# Patient Record
Sex: MALE | Race: WHITE | NOT HISPANIC OR LATINO | Employment: OTHER | URBAN - METROPOLITAN AREA
[De-identification: names, ages, dates, MRNs, and addresses within clinical notes are randomized per-mention and may not be internally consistent; named-entity substitution may affect disease eponyms.]

---

## 2018-01-22 ENCOUNTER — CONVERSION ENCOUNTER (OUTPATIENT)
Dept: OTHER | Facility: HOSPITAL | Age: 68
End: 2018-01-22

## 2018-01-22 ENCOUNTER — OFFICE VISIT CONVERTED (OUTPATIENT)
Dept: CARDIOLOGY | Facility: CLINIC | Age: 68
End: 2018-01-22
Attending: SPECIALIST

## 2018-05-07 ENCOUNTER — OFFICE VISIT CONVERTED (OUTPATIENT)
Dept: CARDIOLOGY | Facility: CLINIC | Age: 68
End: 2018-05-07
Attending: SPECIALIST

## 2018-05-07 ENCOUNTER — CONVERSION ENCOUNTER (OUTPATIENT)
Dept: OTHER | Facility: HOSPITAL | Age: 68
End: 2018-05-07

## 2018-08-23 ENCOUNTER — OFFICE VISIT CONVERTED (OUTPATIENT)
Dept: CARDIOLOGY | Facility: CLINIC | Age: 68
End: 2018-08-23
Attending: SPECIALIST

## 2019-03-18 ENCOUNTER — OFFICE VISIT CONVERTED (OUTPATIENT)
Dept: CARDIOLOGY | Facility: CLINIC | Age: 69
End: 2019-03-18
Attending: SPECIALIST

## 2019-03-18 ENCOUNTER — CONVERSION ENCOUNTER (OUTPATIENT)
Dept: OTHER | Facility: HOSPITAL | Age: 69
End: 2019-03-18

## 2019-09-23 ENCOUNTER — CONVERSION ENCOUNTER (OUTPATIENT)
Dept: CARDIOLOGY | Facility: CLINIC | Age: 69
End: 2019-09-23
Attending: SPECIALIST

## 2019-09-23 ENCOUNTER — CONVERSION ENCOUNTER (OUTPATIENT)
Dept: OTHER | Facility: HOSPITAL | Age: 69
End: 2019-09-23

## 2020-07-20 ENCOUNTER — CONVERSION ENCOUNTER (OUTPATIENT)
Dept: OTHER | Facility: HOSPITAL | Age: 70
End: 2020-07-20

## 2020-07-20 ENCOUNTER — CONVERSION ENCOUNTER (OUTPATIENT)
Dept: CARDIOLOGY | Facility: CLINIC | Age: 70
End: 2020-07-20

## 2020-07-20 ENCOUNTER — OFFICE VISIT CONVERTED (OUTPATIENT)
Dept: CARDIOLOGY | Facility: CLINIC | Age: 70
End: 2020-07-20
Attending: SPECIALIST

## 2021-01-25 ENCOUNTER — OFFICE VISIT CONVERTED (OUTPATIENT)
Dept: CARDIOLOGY | Facility: CLINIC | Age: 71
End: 2021-01-25
Attending: SPECIALIST

## 2021-02-03 ENCOUNTER — HOSPITAL ENCOUNTER (OUTPATIENT)
Dept: NUCLEAR MEDICINE | Facility: HOSPITAL | Age: 71
Discharge: HOME | End: 2021-02-03
Attending: SPECIALIST

## 2021-02-22 ENCOUNTER — OFFICE VISIT CONVERTED (OUTPATIENT)
Dept: CARDIOLOGY | Facility: CLINIC | Age: 71
End: 2021-02-22
Attending: SPECIALIST

## 2021-04-13 ENCOUNTER — HOSPITAL ENCOUNTER (OUTPATIENT)
Dept: CARDIOLOGY | Facility: HOSPITAL | Age: 71
Discharge: HOME | End: 2021-04-13
Attending: SURGERY

## 2021-05-10 NOTE — PROCEDURES
Procedure Note      Patient Name: Leon Silver   Patient ID: [id number]   Sex: Male   YOB: 1950    Primary Care Provider: Shirley IYER   Referring Provider: Shirley IYER    Visit Date: January 25, 2021    Provider: Brian Hdez MD   Location: [facility]   Location Address: [address] [place] [address]  [id number]   Location Phone: [phone]          FINAL REPORT   TRANSTHORACIC ECHOCARDIOGRAM REPORT    Diagnosis: Chest pain, precordial   Height: 5'8 Weight: 226 B/P: BLOOD PRESSURE BSA: 2.16   Tech: [facility]   MEASUREMENTS:  RVID (Diastole) : RVID. (NORMAL: 0.7 to 2.4 cm max)   LVID (Systole): 3.4 cm (Diastole): 5.1 cm . (NORMAL: 3.7 - 5.4 cm)   Posterior Wall Thickness (Diastole): 1.1 cm. (NORMAL: 0.8 - 1.1 cm)   Septal Thickness (Diastole): 1 cm. (NORMAL: 0.7 - 1.2 cm)   LAID (Systole): 3.7 cm. (NORMAL: 1.9 - 3.8 cm)   Aortic Root Diameter (Diastole): 3.2 cm. (NORMAL: 2.0 - 3.7 cm)   DOPPLER:  E/A ratio 0.6 (NORMAL 0.8-2.0)   DT: 269 msec (NORMAL 140-240 msec.)   IVRT 90 m/sec (NORMAL  m/sec.)   E/E': 7 (NORMAL <8 avg.)   COMMENTS:  The patient underwent 2-D, M-Mode, and Doppler examination, including pulse-wave, continuous-wave, and color-flow analysis; the study is technically adequate.   FINDINGS:  AORTIC VALVE: fibrocalcific.   MITRAL VALVE: fibrocalcific.   TRICUSPID VALVE: Normal.   PULMONIC VALVE: Not well visualized.   LEFT ATRIUM: Normal. No intracavitary masses or clots seen. LA volume index is 35 mL/m2.   AORTIC ROOT: Normal in size with adequate motion.   LEFT VENTRICLE: Normal left ventricular systolic function. Ejection fraction 60%.   RIGHT ATRIUM: Normal.   RIGHT VENTRICLE: Normal size and function.   PERICARDIUM: Unremarkable. No evidence of effusion.   INFERIOR VENA CAVA: Diameter is 1.6 cm.   DOPPLER: Doppler examination of the aortic, mitral, tricuspid, and pulmonary valves was performed. Normal pulmonary artery systolic  pressure by Doppler. Shows mild aortic regurgitation. Grade 1 diastolic dysfunction.      CONCLUSION    1. Fibrocalcific mitral and aortic valves.  2. Normal left ventricle systolic function.  3. Mild aortic regurgitation.  4. Grade 1 diastolic dysfunction.      Brian Hdez MD  RADHA/wt                 Electronically Signed by: Madyson Cabral-, -Author on January 26, 2021 07:36:15 AM  Electronically Co-signed by: Brian Hdez MD -Reviewer on January 26, 2021 01:12:53 PM

## 2021-05-13 NOTE — PROGRESS NOTES
"   Progress Note      Patient Name: Leon Silver   Patient ID: [id number]   Sex: Male   YOB: 1950    Primary Care Provider: Shirley IYER   Referring Provider: Shirley IYER    Visit Date: July 20, 2020    Provider: Brian Hdez MD   Location: [facility]   Location Address: [address] [place] [address]  [id number]   Location Phone: [phone]          Chief Complaint  · Coronary artery disease  · Shortness of breath      History Of Present Illness  Leon Silver is a 70 year old /White male with a history of Coronary artery disease; history of coronary artery bypass graft surgery. Patient denies chest pain. Shortness of breath is stable. Blood pressure controlled at home. No PND or orthopnea.   Medications  Meds at present include: Atorvastatin 20 mg qd; Carvedilol 12.5 mg qd; Doxazosin 8 mg qd; ASA 81 mg qd; Losartan/Hydrochlorothiazide 50/12.5 mg qd; Omeprazole 20 mg qd; Fenofibrate 160 mg qd; Vitamin C 500 mg qd; Levothyroxine 50 mcg qd; Potassium 10 meq qd; Vit D3-50 mcg qd.   PAST MEDICAL HISTORY: negative for diabetes,; positive for hypertension, history of coronary artery bypass graft surgery and hyperlipidemia.   FAMILY HISTORY: positive for diabetes and heart disease. Negative for hypertension.   PSYCHO/SOCIAL HISTORY: negative for mood changes and depression; he drinks alcohol daily and does smoke.       Review of Systems  · Cardiovascular  o Admits  o : shortness of breath  o Denies  o : palpitations (fast, fluttering, or skipping beats), swelling (feet, ankles, hands), chest pain or angina pectoris  · Respiratory  o Denies  o : chronic or frequent cough, asthma or wheezing      Vitals  Date Time BP Position Site L\R Cuff Size HR RR TEMP (F) WT  HT  BMI kg/m2 BSA m2 O2 Sat HC       07/20/2020 09:10 /71 Sitting    65 - R   226lbs 0oz 5'  8\" 34.36 2.22     07/20/2020 09:10 /73 Sitting    67 - R   226lbs 0oz 5'  8\" 34.36 2.22   "         Physical Examination  · Constitutional  o Appearance  o : Alert and Oriented X3. The patient is obese.   · Respiratory  o Inspection of Chest  o : No chest wall deformities, moving equal  o Auscultation of Lungs  o : Good air entry with vesicular breath sounds  · Cardiovascular  o Heart  o :   § Auscultation of Heart  § : S1 and S2 are regular. No S3. No S4. No murmurs.  o Peripheral Vascular System  o :   § Extremities  § : Peripheral pulses were well felt. No edema. No cyanosis.  · Gastrointestinal  o Abdominal Examination  o : No masses or tenderness noted.           Assessment     IMPRESSION/PLAN    1. Coronary artery disease; history of coronary artery bypass graft surgery stable. Continue current dose of Carvedilol.    2. Morbid obesity. I asked him to be on a low fat diet and try to lose some weight.  3. Essential hypertension controlled. Continue current dose of Losartan.   4. Hyperlipidemia. Continue current dose of Lipitor.   5. Positive for nicotine use. Smoking cessation instructions were discussed with the patient again.    6. See me back in 6 months.        MD RADHA Tuttle/wt            Plan  · Instructions  o This note was transcribed by Michelle Cabral. RADHA/wt  o The above service was transcribed by Michelle Cabral on my behalf and I attest to the accuracy of the note. RADHA            Electronically Signed by: Madyson Cabral-, -Author on July 22, 2020 09:14:39 AM  Electronically Co-signed by: Brian Hdez MD -Reviewer on July 24, 2020 08:36:39 AM

## 2021-05-14 VITALS
WEIGHT: 266.25 LBS | HEIGHT: 68 IN | DIASTOLIC BLOOD PRESSURE: 64 MMHG | BODY MASS INDEX: 40.35 KG/M2 | HEART RATE: 71 BPM | SYSTOLIC BLOOD PRESSURE: 133 MMHG

## 2021-05-14 VITALS
DIASTOLIC BLOOD PRESSURE: 45 MMHG | BODY MASS INDEX: 34.25 KG/M2 | WEIGHT: 226 LBS | HEIGHT: 68 IN | SYSTOLIC BLOOD PRESSURE: 117 MMHG | HEART RATE: 76 BPM

## 2021-05-14 NOTE — PROGRESS NOTES
Progress Note      Patient Name: Leon Silver   Patient ID: [id number]   Sex: Male   YOB: 1950    Primary Care Provider: Shirley IYER   Referring Provider: Shirley IYER    Visit Date: January 25, 2021    Provider: Brian Hdez MD   Location: [facility]   Location Address: [address] [place] [address]  [id number]   Location Phone: [phone]          Chief Complaint  · Coronary artery disease  · Shortness of breath  · Back pain  · Chest pain      History Of Present Illness  Leon Silver is a 70 year old /White male with a history of Coronary artery disease; history of coronary artery bypass graft surgery. Patient denies chest pain. Recently he has been having back pain mostly on exertion relieved by rest been going on for the last few months. He has quit smoking four months ago. Shortness of breath is stable. Blood pressure controlled at home. No PND or orthopnea.   Medications  Meds at present include: Atorvastatin 20 mg qd; Carvedilol 12.5 mg qd; Doxazosin 8 mg qd; ASA 81 mg qd; Losartan/Hydrochlorothiazide 50/12.5 mg qd; Omeprazole 20 mg qd; Fenofibrate 160 mg qd; Vitamin C 500 mg qd; Levothyroxine 50 mcg qd; Potassium 10 meq qd; Vit D3-50 mcg qd; Baclofen 10 mg bid.   PAST MEDICAL HISTORY: negative for diabetes; positive for hypertension, history of single vessel coronary artery bypass graft surgery with PRETTY graft to the LAD; hyperlipidemia.   PSYCHO/SOCIAL HISTORY: he drinks alcohol moderately and recently quit smoking.       Review of Systems  · Cardiovascular  o Admits  o : shortness of breath and palpitations occasionally  o Denies  o : swelling (feet, ankles, hands), chest pain or angina pectoris  · Respiratory  o Denies  o : chronic or frequent cough, asthma or wheezing      Vitals  Date Time BP Position Site L\R Cuff Size HR RR TEMP (F) WT  HT  BMI kg/m2 BSA m2 O2 Sat FR L/min FiO2 HC       01/25/2021 12:02 /45 Sitting    " 76 - R   226lbs 0oz 5'  8\" 34.36 2.22             Physical Examination  · Constitutional  o Appearance  o : Alert and Oriented X3. The patient is obese.   · Respiratory  o Inspection of Chest  o : No chest wall deformities, moving equal  o Auscultation of Lungs  o : Good air entry with vesicular breath sounds  · Cardiovascular  o Heart  o :   § Auscultation of Heart  § : S1 and S2 are regular. No S3. No S4. No murmurs.  o Peripheral Vascular System  o :   § Extremities  § : Peripheral pulses were well felt. No edema. No cyanosis.  · Gastrointestinal  o Abdominal Examination  o : No masses or tenderness noted.   · EKG  o EKG  o : was done today  o Indications  o : Coronary artery disease   o Results  o : sinus rhythm; incomplete right bundle branch block pattern          Assessment     IMPRESSION/PLAN    1. Coronary artery disease; history of coronary artery bypass graft surgery stable. Continue current dose of Carvedilol.  Exertional angina. In view of his symptoms suggesting angina I will start him on Imdur 30 mg qd. Will do a Lexiscan stress test to rule out any significant ischemia. Will do an echocardiogram to evaluate left ventricle systolic function.   2. Morbid obesity. I asked him to be on a low fat diet and try to lose some weight.  3. Essential hypertension controlled. Continue current dose of Carvedilol and Losartan.   4. Hyperlipidemia. Continue current dose of Lipitor.      MD RADHA Tuttle/wt            Plan  · Instructions  o This note was transcribed by Michelle Cabral. RADHA/wt  o The above service was transcribed by Michelle Cabral on my behalf and I attest to the accuracy of the note. RADHA            Electronically Signed by: Madyson Cabral-, -Author on January 26, 2021 11:40:46 AM  Electronically Co-signed by: Brian Hdez MD -Reviewer on January 26, 2021 01:11:53 PM  "

## 2021-05-14 NOTE — PROGRESS NOTES
"   Progress Note      Patient Name: Leon Silver   Patient ID: [id number]   Sex: Male   YOB: 1950    Primary Care Provider: Shirley IYER   Referring Provider: Shirley IYER    Visit Date: February 22, 2021    Provider: Brian Hdez MD   Location: [facility]   Location Address: [address] [place] [address]  [id number]   Location Phone: [phone]          Chief Complaint  · Coronary artery disease  · Shortness of breath  · Back pain      History Of Present Illness  Leon Silver is a 70 year old /White male with a history of Coronary artery disease; history of coronary artery bypass graft surgery. He has some back pain mostly on exertion. Recent Lexiscan stress test was negative for ischemia. No chest pain. Shortness of breath is stable.   Medications  Meds at present include: Atorvastatin 20 mg qd; Carvedilol 12.5 mg qd; Doxazosin 8 mg qd; ASA 81 mg qd; Losartan/Hydrochlorothiazide 50/12.5 mg qd; Omeprazole 20 mg qd; Fenofibrate 160 mg qd; Vitamin C 500 mg qd; Levothyroxine 50 mcg qd; Potassium 10 meq qd; Vit D3-50 mcg qd.   PAST MEDICAL HISTORY: negative for diabetes; positive for hypertension, history of single vessel coronary artery bypass graft surgery with PRETTY graft to the LAD; hyperlipidemia.   PSYCHO/SOCIAL HISTORY: he drinks alcohol moderately and recently quit smoking.       Review of Systems  · Cardiovascular  o Admits  o : shortness of breath  o Denies  o : palpitations, swelling (feet, ankles, hands), chest pain or angina pectoris  · Respiratory  o Denies  o : chronic or frequent cough, asthma or wheezing      Vitals  Date Time BP Position Site L\R Cuff Size HR RR TEMP (F) WT  HT  BMI kg/m2 BSA m2 O2 Sat FR L/min FiO2 HC       02/22/2021 01:51 /64 Sitting    71 - R   266lbs 4oz 5'  8\" 40.48 2.41             Physical Examination  · Constitutional  o Appearance  o : Alert and Oriented X3. The patient is obese. "   · Respiratory  o Inspection of Chest  o : No chest wall deformities, moving equal  o Auscultation of Lungs  o : Good air entry with vesicular breath sounds  · Cardiovascular  o Heart  o :   § Auscultation of Heart  § : S1 and S2 are regular. No S3. No S4. No murmurs.  o Peripheral Vascular System  o :   § Extremities  § : Peripheral pulses were well felt. No edema. No cyanosis.  · Gastrointestinal  o Abdominal Examination  o : No masses or tenderness noted.           Assessment     IMPRESSION/PLAN    1. Coronary artery disease; history of coronary artery bypass graft surgery stable. Continue current dose of Carvedilol and aspirin.   I discussed with the patient the results of his stress test.   2. Back pain on exertion, probably claudication pain. I will refer him for vascular consult to evaluate for any peripheral vascular disease or aortic disease causing his back pain.   3. Hypothyroidism. Continue current dose of Levothyroxine managed by his PMD.   4. Essential hypertension controlled. Continue current dose of Carvedilol and Losartan.   5. Hyperlipidemia. Continue current dose of Lipitor.   6. See me back in six months.    MD RADHA Tuttle/wt            Plan  · Instructions  o This note was transcribed by Michelle Cabral. RADHA/wt  o The above service was transcribed by Michelle Cabral on my behalf and I attest to the accuracy of the note. RADHA            Electronically Signed by: Madyson Cabral-, -Author on February 25, 2021 07:20:37 AM  Electronically Co-signed by: Brian Hdez MD -Reviewer on March 15, 2021 12:01:13 PM

## 2021-05-15 VITALS
SYSTOLIC BLOOD PRESSURE: 158 MMHG | WEIGHT: 230 LBS | HEIGHT: 68 IN | DIASTOLIC BLOOD PRESSURE: 66 MMHG | BODY MASS INDEX: 34.86 KG/M2 | HEART RATE: 69 BPM

## 2021-05-15 VITALS
WEIGHT: 226 LBS | HEIGHT: 68 IN | SYSTOLIC BLOOD PRESSURE: 157 MMHG | DIASTOLIC BLOOD PRESSURE: 71 MMHG | BODY MASS INDEX: 34.25 KG/M2 | HEART RATE: 65 BPM

## 2021-05-15 VITALS
DIASTOLIC BLOOD PRESSURE: 68 MMHG | BODY MASS INDEX: 34.4 KG/M2 | HEART RATE: 81 BPM | HEIGHT: 68 IN | SYSTOLIC BLOOD PRESSURE: 126 MMHG | WEIGHT: 227 LBS

## 2021-05-15 VITALS
HEIGHT: 68 IN | DIASTOLIC BLOOD PRESSURE: 71 MMHG | SYSTOLIC BLOOD PRESSURE: 151 MMHG | HEART RATE: 65 BPM | BODY MASS INDEX: 34.25 KG/M2 | WEIGHT: 226 LBS

## 2021-05-16 VITALS
HEART RATE: 64 BPM | WEIGHT: 217 LBS | DIASTOLIC BLOOD PRESSURE: 70 MMHG | SYSTOLIC BLOOD PRESSURE: 169 MMHG | HEIGHT: 68 IN | BODY MASS INDEX: 32.89 KG/M2

## 2021-05-16 VITALS
HEART RATE: 69 BPM | DIASTOLIC BLOOD PRESSURE: 88 MMHG | WEIGHT: 219 LBS | SYSTOLIC BLOOD PRESSURE: 153 MMHG | BODY MASS INDEX: 33.19 KG/M2 | HEIGHT: 68 IN

## 2021-05-16 VITALS
HEIGHT: 68 IN | HEART RATE: 60 BPM | DIASTOLIC BLOOD PRESSURE: 92 MMHG | BODY MASS INDEX: 31.83 KG/M2 | WEIGHT: 210 LBS | SYSTOLIC BLOOD PRESSURE: 158 MMHG

## 2021-07-28 ENCOUNTER — OFFICE VISIT (OUTPATIENT)
Dept: ORTHOPEDIC SURGERY | Facility: CLINIC | Age: 71
End: 2021-07-28

## 2021-07-28 VITALS — WEIGHT: 266 LBS | BODY MASS INDEX: 40.32 KG/M2 | HEART RATE: 88 BPM | OXYGEN SATURATION: 99 % | HEIGHT: 68 IN

## 2021-07-28 DIAGNOSIS — M17.12 PRIMARY OSTEOARTHRITIS OF LEFT KNEE: Primary | ICD-10-CM

## 2021-07-28 DIAGNOSIS — M19.011 PRIMARY OSTEOARTHRITIS OF RIGHT SHOULDER: ICD-10-CM

## 2021-07-28 DIAGNOSIS — M25.562 LEFT KNEE PAIN, UNSPECIFIED CHRONICITY: ICD-10-CM

## 2021-07-28 DIAGNOSIS — M25.511 RIGHT SHOULDER PAIN, UNSPECIFIED CHRONICITY: ICD-10-CM

## 2021-07-28 PROCEDURE — 99203 OFFICE O/P NEW LOW 30 MIN: CPT | Performed by: ORTHOPAEDIC SURGERY

## 2021-07-28 PROCEDURE — 20610 DRAIN/INJ JOINT/BURSA W/O US: CPT | Performed by: ORTHOPAEDIC SURGERY

## 2021-07-28 RX ORDER — OMEPRAZOLE 20 MG/1
20 CAPSULE, DELAYED RELEASE ORAL DAILY
COMMUNITY
Start: 2021-07-20

## 2021-07-28 RX ORDER — DOXAZOSIN 8 MG/1
8 TABLET ORAL NIGHTLY
COMMUNITY
Start: 2021-06-21 | End: 2022-11-21 | Stop reason: ALTCHOICE

## 2021-07-28 RX ORDER — CARVEDILOL 12.5 MG/1
TABLET ORAL 2 TIMES DAILY WITH MEALS
COMMUNITY
Start: 2021-06-21 | End: 2022-05-02 | Stop reason: SDUPTHER

## 2021-07-28 RX ORDER — ATORVASTATIN CALCIUM 20 MG/1
20 TABLET, FILM COATED ORAL DAILY
COMMUNITY
Start: 2021-07-11

## 2021-07-28 RX ORDER — LEVOTHYROXINE SODIUM 0.05 MG/1
50 TABLET ORAL DAILY
COMMUNITY
Start: 2021-06-21

## 2021-07-28 RX ORDER — POTASSIUM CHLORIDE 750 MG/1
10 TABLET, EXTENDED RELEASE ORAL DAILY
COMMUNITY
Start: 2021-06-03

## 2021-07-28 RX ORDER — FENOFIBRATE 160 MG/1
160 TABLET ORAL DAILY
COMMUNITY
Start: 2021-06-03

## 2021-07-28 RX ORDER — LOSARTAN POTASSIUM AND HYDROCHLOROTHIAZIDE 12.5; 5 MG/1; MG/1
1 TABLET ORAL DAILY
COMMUNITY
Start: 2021-07-20 | End: 2022-11-21 | Stop reason: DRUGHIGH

## 2021-07-28 RX ADMIN — LIDOCAINE HYDROCHLORIDE 9 ML: 10 INJECTION, SOLUTION INFILTRATION; PERINEURAL at 11:22

## 2021-07-28 RX ADMIN — METHYLPREDNISOLONE ACETATE 80 MG: 80 INJECTION, SUSPENSION INTRA-ARTICULAR; INTRALESIONAL; INTRAMUSCULAR; SOFT TISSUE at 11:22

## 2021-07-28 NOTE — PROGRESS NOTES
"Chief Complaint  Initial Evaluation of the Left Knee and Initial Evaluation of the Right Shoulder     Subjective      Leon Silver presents to [facility] for an evaluation of left knee and right shoulder. Patient states left knee pain is localized along the medial joint line. He states knee pain has been ongoing for 6 months. He has had a gel injection in the past that has provided him with relief.     He states right shoulder pain has been ongoing for several years. He states pain is laterally and anteriorly. He denies any injury or trauma to his shoulder. He states pain with range of motion in his shoulder and has noticed a decrease in his range of motion.     Allergies   Allergen Reactions   • Morphine Rash        Social History     Socioeconomic History   • Marital status:      Spouse name: Not on file   • Number of children: Not on file   • Years of education: Not on file   • Highest education level: Not on file   Tobacco Use   • Smoking status: Never Smoker   Vaping Use   • Vaping Use: Never used        Review of Systems     Objective   Vital Signs:   Pulse 88   Ht 172.7 cm (68\")   Wt 121 kg (266 lb)   SpO2 99%   BMI 40.45 kg/m²       Physical Exam  Constitutional:       Appearance: Normal appearance. He is well-developed and normal weight.   HENT:      Head: Normocephalic.      Right Ear: Hearing and external ear normal.      Left Ear: Hearing and external ear normal.      Nose: Nose normal.   Eyes:      Conjunctiva/sclera: Conjunctivae normal.   Cardiovascular:      Rate and Rhythm: Normal rate.   Pulmonary:      Effort: Pulmonary effort is normal.      Breath sounds: No wheezing or rales.   Abdominal:      Palpations: Abdomen is soft.      Tenderness: There is no abdominal tenderness.   Musculoskeletal:      Cervical back: Normal range of motion.   Skin:     Findings: No rash.   Neurological:      Mental Status: He is alert and oriented to person, place, and time. "   Psychiatric:         Mood and Affect: Mood and affect normal.         Judgment: Judgment normal.       Ortho Exam      RIGHT SHOULDER: Positive crepitus. Forward flexion to 100 degrees. IR to Si joint. No swelling or skin discoloration. Full elbow range of motion. Decreased shoulder range of motion. Tender subacromial bursa. Tender greater tuberosity. Good tone of deltoid, triceps, wrist extensors and wrist flexors. Radial pulse 2+, ulnar pulse 2+.       LEFT KNEE: [place] legged. Good strength to hamstrings, quadriceps, dorsiflexors and plantar flexors. Sensation grossly intact. -5 degrees of extension. Flexion to 105 degrees. No swelling or skin discoloration. Tender medial and lateral joint line. Stable to varus/valgus stress. Negative Lachman. Positive crepitus. Limping gait. Full weight bearing.       Large Joint Arthrocentesis  Date/Time: 7/28/2021 11:22 AM  Consent given by: patient  Site marked: site marked  Timeout: Immediately prior to procedure a time out was called to verify the correct patient, procedure, equipment, support staff and site/side marked as required   Supporting Documentation  Indications: pain   Procedure Details  Location: LEFT KNEE, RIGHT SHOULDER.  Needle size: 22 G  Medications administered: 9 mL lidocaine 1 %; 80 mg methylPREDNISolone acetate 80 MG/ML  Patient tolerance: patient tolerated the procedure well with no immediate complications              Imaging Results (Most Recent)     Procedure Component Value Units Date/Time    XR Knee 3 View Left [[id number]] Resulted: 07/28/21 1049     Updated: 07/28/21 1053    XR Scapula Right [[id number]] Resulted: 07/28/21 1049     Updated: 07/28/21 1053           Result Review :       X-Ray Report:  Left knee(s) X-Ray  Indication: Evaluation of left knee   AP, Lateral and Standing view(s)  Findings: Demonstrates advanced bone on bone osteoarthritis. No fracture or dislocation is noted.   Prior studies available for comparison: no     X-Ray  Report:  Right shoulder(s) X-Ray  Indication: Evaluation of right shoulder   AP and Lateral view(s)  Findings: Demonstrates advanced degenerative changes of the shoulder. No fracture or dislocation is noted.   Prior studies available for comparison: no          Assessment and Plan     DX: Left knee osteoarthritis  Right shoulder osteoarthritis     Discussed treatment plans and diagnosis with the patient. Patient given a left knee injection and tolerated this procedure well. Patient given a right shoulder injection and tolerated this procedure well.     Call or return if worsening symptoms.    Follow Up     PRN.       Patient was given instructions and counseling regarding his condition or for health maintenance advice. Please see specific information pulled into the AVS if appropriate.     Scribed for Lazarus Samuel MD by Yas Mccormick.  07/28/21   11:01 EDT    I have personally performed the services described in this document as scribed by the above individual and it is both accurate and complete. Lazarus Samuel MD 07/29/21

## 2021-07-29 RX ORDER — LIDOCAINE HYDROCHLORIDE 10 MG/ML
9 INJECTION, SOLUTION INFILTRATION; PERINEURAL
Status: COMPLETED | OUTPATIENT
Start: 2021-07-28 | End: 2021-07-28

## 2021-07-29 RX ORDER — METHYLPREDNISOLONE ACETATE 80 MG/ML
80 INJECTION, SUSPENSION INTRA-ARTICULAR; INTRALESIONAL; INTRAMUSCULAR; SOFT TISSUE
Status: COMPLETED | OUTPATIENT
Start: 2021-07-28 | End: 2021-07-28

## 2021-10-21 PROBLEM — I25.10 CORONARY ARTERY DISEASE INVOLVING NATIVE CORONARY ARTERY OF NATIVE HEART WITHOUT ANGINA PECTORIS: Status: ACTIVE | Noted: 2021-10-21

## 2021-10-21 PROBLEM — E78.2 HYPERLIPEMIA, MIXED: Status: ACTIVE | Noted: 2021-10-21

## 2021-10-21 PROBLEM — Z95.5 HISTORY OF CORONARY ANGIOPLASTY WITH INSERTION OF STENT: Status: ACTIVE | Noted: 2021-10-21

## 2021-10-21 PROBLEM — I10 HYPERTENSION, ESSENTIAL: Status: ACTIVE | Noted: 2021-10-21

## 2021-10-25 ENCOUNTER — OFFICE VISIT (OUTPATIENT)
Dept: CARDIOLOGY | Facility: CLINIC | Age: 71
End: 2021-10-25

## 2021-10-25 VITALS
DIASTOLIC BLOOD PRESSURE: 88 MMHG | BODY MASS INDEX: 32.43 KG/M2 | SYSTOLIC BLOOD PRESSURE: 136 MMHG | WEIGHT: 214 LBS | HEIGHT: 68 IN | HEART RATE: 86 BPM

## 2021-10-25 DIAGNOSIS — I25.10 CORONARY ARTERY DISEASE INVOLVING NATIVE CORONARY ARTERY OF NATIVE HEART WITHOUT ANGINA PECTORIS: Primary | ICD-10-CM

## 2021-10-25 DIAGNOSIS — I10 HYPERTENSION, ESSENTIAL: ICD-10-CM

## 2021-10-25 DIAGNOSIS — Z95.5 HISTORY OF CORONARY ANGIOPLASTY WITH INSERTION OF STENT: ICD-10-CM

## 2021-10-25 DIAGNOSIS — E78.2 HYPERLIPEMIA, MIXED: ICD-10-CM

## 2021-10-25 PROCEDURE — 99214 OFFICE O/P EST MOD 30 MIN: CPT | Performed by: SPECIALIST

## 2021-10-25 RX ORDER — ASPIRIN 81 MG/1
81 TABLET ORAL DAILY
COMMUNITY

## 2022-02-16 ENCOUNTER — OFFICE VISIT (OUTPATIENT)
Dept: ORTHOPEDIC SURGERY | Facility: CLINIC | Age: 72
End: 2022-02-16

## 2022-02-16 VITALS — OXYGEN SATURATION: 98 % | BODY MASS INDEX: 32.46 KG/M2 | WEIGHT: 214.2 LBS | HEIGHT: 68 IN | HEART RATE: 88 BPM

## 2022-02-16 DIAGNOSIS — M17.12 PRIMARY OSTEOARTHRITIS OF LEFT KNEE: Primary | ICD-10-CM

## 2022-02-16 PROCEDURE — 20610 DRAIN/INJ JOINT/BURSA W/O US: CPT | Performed by: ORTHOPAEDIC SURGERY

## 2022-02-16 RX ORDER — TRAZODONE HYDROCHLORIDE 50 MG/1
TABLET ORAL
COMMUNITY
Start: 2022-01-27 | End: 2022-11-21 | Stop reason: ALTCHOICE

## 2022-02-16 NOTE — PROGRESS NOTES
"Chief Complaint  Pain of the Left Knee     Subjective      Leon Silver presents to [facility] for an evaluation of left knee. Patient has left knee osteoarthritis. Left knee pain has been ongoing for almost 1 year. Patient has had gel injections in the past that has given him relief of pain.     Allergies   Allergen Reactions   • Morphine Rash        Social History     Socioeconomic History   • Marital status:    Tobacco Use   • Smoking status: Former Smoker   • Smokeless tobacco: Current User   Vaping Use   • Vaping Use: Never used   Substance and Sexual Activity   • Alcohol use: Yes     Comment: SOME   • Drug use: Never   • Sexual activity: Defer        Review of Systems     Objective   Vital Signs:   Pulse 88   Ht 172.7 cm (68\")   Wt 97.2 kg (214 lb 3.2 oz)   SpO2 98%   BMI 32.57 kg/m²       Physical Exam  Constitutional:       Appearance: Normal appearance. Patient is well-developed and normal weight.   HENT:      Head: Normocephalic.      Right Ear: Hearing and external ear normal.      Left Ear: Hearing and external ear normal.      Nose: Nose normal.   Eyes:      Conjunctiva/sclera: Conjunctivae normal.   Cardiovascular:      Rate and Rhythm: Normal rate.   Pulmonary:      Effort: Pulmonary effort is normal.      Breath sounds: No wheezing or rales.   Abdominal:      Palpations: Abdomen is soft.      Tenderness: There is no abdominal tenderness.   Musculoskeletal:      Cervical back: Normal range of motion.   Skin:     Findings: No rash.   Neurological:      Mental Status: Patient  is alert and oriented to person, place, and time.   Psychiatric:         Mood and Affect: Mood and affect normal.         Judgment: Judgment normal.       Ortho Exam      LEFT KNEE: Flexion to 100 degrees. Full extension. Good strength to hamstrings, quadriceps, dorsiflexors and plantar flexors. Stable to varus/valgus stress. Negative lachman. Tender joint lines. No swelling, skin " discoloration or atrophy. Sensation grossly intact. Neurovascular intact.  Dorsal Pedal Pulse 2+, posterior tibialis pulse 2+.       Large Joint Arthrocentesis: L knee  Date/Time: 2/16/2022 3:31 PM  Consent given by: patient  Site marked: site marked  Timeout: Immediately prior to procedure a time out was called to verify the correct patient, procedure, equipment, support staff and site/side marked as required   Supporting Documentation  Indications: pain   Procedure Details  Location: knee - L knee  Preparation: Patient was prepped and draped in the usual sterile fashion  Needle size: 22 G  Medications administered: 48 mg hylan 48 MG/6ML  Patient tolerance: patient tolerated the procedure well with no immediate complications              Imaging Results (Most Recent)     None           Result Review :         No results found.          Assessment and Plan     DX: Left knee osteoarthritis     Discussed treatment plans and diagnosis with the patient. Patient given a left knee Synvisc one injection, he tolerated this well.     Call or return if worsening symptoms.    Follow Up     4-6 weeks.       Patient was given instructions and counseling regarding his condition or for health maintenance advice. Please see specific information pulled into the AVS if appropriate.     Scribed for Lazarus Samuel MD by Yas Mccormick.  02/16/22   15:26 EST    I have personally performed the services described in this document as scribed by the above individual and it is both accurate and complete. Lazarus Samuel MD 02/18/22

## 2022-05-01 NOTE — PROGRESS NOTES
[facility]  Cardiology progress Note    Patient Name: Leon Silver  : 1950    CHIEF COMPLAINT  Coronary artery disease s/p CABG      Subjective   Subjective     HISTORY OF PRESENT ILLNESS    Leon Silver is a 71 y.o. male with history of coronary disease s/p CABG.  No chest pain or shortness of breath.    Review of Systems:   Constitutional no fever,  no weight loss   Skin no rash   Otolaryngeal no difficulty swallowing   Cardiovascular See HPI   Pulmonary no cough, no sputum production   Gastrointestinal no constipation, no diarrhea   Genitourinary no dysuria, no hematuria   Hematologic no easy bruisability, no abnormal bleeding   Musculoskeletal no muscle pain   Neurologic no dizziness, no falls         Personal History     Social History:  reports that he has quit smoking. He uses smokeless tobacco. He reports current alcohol use. He reports that he does not use drugs.    Home Medications:  Current Outpatient Medications on File Prior to Visit   Medication Sig   • aspirin 81 MG EC tablet Take 81 mg by mouth Daily.   • atorvastatin (LIPITOR) 20 MG tablet Take 20 mg by mouth Daily.   • doxazosin (CARDURA) 8 MG tablet Take 8 mg by mouth Every Night.   • fenofibrate 160 MG tablet Take 160 mg by mouth Daily.   • fluticasone-salmeterol (ADVAIR) 100-50 MCG/DOSE DISKUS INHALE ONE PUFF TWICE DAILY RINSE MOUTH AFTER USE   • levothyroxine (SYNTHROID, LEVOTHROID) 50 MCG tablet Take 50 mcg by mouth Daily.   • losartan-hydrochlorothiazide (HYZAAR) 50-12.5 MG per tablet Take 1 tablet by mouth Daily.   • omeprazole (priLOSEC) 20 MG capsule Take 20 mg by mouth Daily.   • potassium chloride (K-DUR,KLOR-CON) 10 MEQ CR tablet Take 10 mEq by mouth Daily.   • traZODone (DESYREL) 50 MG tablet    • [DISCONTINUED] carvedilol (COREG) 12.5 MG tablet Take  by mouth 2 (Two) Times a Day With Meals.     No current facility-administered medications on file prior to visit.     Allergies:  Allergies   Allergen Reactions   •  Morphine Rash       Objective    Objective       Vitals:   Heart Rate:  [82-88] 82  BP: (144-146)/(72-76) 144/72  Body mass index is 32.84 kg/m².     Physical Exam:   Constitutional: Awake, alert, No acute distress    Eyes: PERRLA, sclerae anicteric, no conjunctival injection   HENT: NCAT, mucous membranes moist   Neck: Supple, no thyromegaly, no lymphadenopathy, trachea midline   Respiratory: Clear to auscultation bilaterally, nonlabored respirations    Cardiovascular: RRR, no murmurs or rubs. Palpable pedal pulses bilaterally   Musculoskeletal: No bilateral ankle edema, no cyanosis to extremities   Psychiatric: Appropriate affect, cooperative   Neurologic: Oriented x 3, strength symmetric in all extremities, Cranial Nerves grossly intact to confrontation, speech clear   Skin: No rashes.    Result Review    Result Review:  I have personally reviewed the available results from  [x]  Laboratory  [x]  EKG  [x]  Cardiology  [x]  Medications  [x]  Old records  []  Other:   Procedures    Impression/Plan:  1. Coronary disease s/p CABG stable: Continue aspirin 81 mg once a day.  Continue carvedilol 25 mg twice daily.  No further chest pain.  2.  Essential hypertension Uncontrolled: Continue Hyzaar 50/12.5 mg once a day.  Continue Cardura 8 mg once a day.  Increase carvedilol to 25 mg twice daily.   Blood pressure regularly. Able to tolerate this antihypertensives without any side effects.  3.  Hyperlipidemia: Continue Lipitor 20 mg once a day.  Continue fenofibrate 160 mg once a day.  Lipid and hepatic profile next visit.  Able to tolerate Lipitor without any side effects.           Brian Hdez MD   05/02/22   12:59 EDT

## 2022-05-02 ENCOUNTER — OFFICE VISIT (OUTPATIENT)
Dept: CARDIOLOGY | Facility: CLINIC | Age: 72
End: 2022-05-02

## 2022-05-02 VITALS
WEIGHT: 216 LBS | SYSTOLIC BLOOD PRESSURE: 144 MMHG | DIASTOLIC BLOOD PRESSURE: 72 MMHG | BODY MASS INDEX: 32.74 KG/M2 | HEIGHT: 68 IN | HEART RATE: 82 BPM

## 2022-05-02 DIAGNOSIS — E78.2 HYPERLIPEMIA, MIXED: ICD-10-CM

## 2022-05-02 DIAGNOSIS — Z95.1 HX OF CABG: ICD-10-CM

## 2022-05-02 DIAGNOSIS — I25.10 CORONARY ARTERY DISEASE INVOLVING NATIVE CORONARY ARTERY OF NATIVE HEART WITHOUT ANGINA PECTORIS: Primary | ICD-10-CM

## 2022-05-02 DIAGNOSIS — I10 HYPERTENSION, ESSENTIAL: ICD-10-CM

## 2022-05-02 PROCEDURE — 99214 OFFICE O/P EST MOD 30 MIN: CPT | Performed by: SPECIALIST

## 2022-05-02 RX ORDER — CARVEDILOL 25 MG/1
25 TABLET ORAL 2 TIMES DAILY WITH MEALS
Qty: 180 TABLET | Refills: 6 | Status: SHIPPED | OUTPATIENT
Start: 2022-05-02 | End: 2022-11-21 | Stop reason: SDUPTHER

## 2022-05-02 RX ORDER — CARVEDILOL 25 MG/1
25 TABLET ORAL 2 TIMES DAILY WITH MEALS
Qty: 180 TABLET | Refills: 6 | Status: SHIPPED | OUTPATIENT
Start: 2022-05-02 | End: 2022-05-02 | Stop reason: SDUPTHER

## 2022-11-19 NOTE — PROGRESS NOTES
[facility]  Cardiology progress Note    Patient Name: Leon Silver  : 1950    CHIEF COMPLAINT  CORONARY ARTERY DISEASE        Subjective   Subjective     HISTORY OF PRESENT ILLNESS    Leon Silver is a 72 y.o. male with coronary artery s/p CABG.  No chest pain or shortness of breath.    REVIEW OF SYSTEMS    Constitutional:    No fever, no weight loss  Skin:     No rash  Otolaryngeal:    No difficulty swallowing  Cardiovascular: See HPI.  Pulmonary:    No cough, no sputum production    Personal History     Social History:    reports that he has quit smoking. He uses smokeless tobacco. He reports current alcohol use. He reports that he does not use drugs.    Home Medications:  Current Outpatient Medications on File Prior to Visit   Medication Sig   • aspirin 81 MG EC tablet Take 81 mg by mouth Daily.   • atorvastatin (LIPITOR) 20 MG tablet Take 20 mg by mouth Daily.   • carvedilol (COREG) 25 MG tablet Take 1 tablet by mouth 2 (Two) Times a Day With Meals.   • fenofibrate 160 MG tablet Take 160 mg by mouth Daily.   • fluticasone-salmeterol (ADVAIR) 100-50 MCG/DOSE DISKUS INHALE ONE PUFF TWICE DAILY RINSE MOUTH AFTER USE   • levothyroxine (SYNTHROID, LEVOTHROID) 50 MCG tablet Take 50 mcg by mouth Daily.   • omeprazole (priLOSEC) 20 MG capsule Take 20 mg by mouth Daily.   • potassium chloride (K-DUR,KLOR-CON) 10 MEQ CR tablet Take 10 mEq by mouth Daily.   • losartan (COZAAR) 100 MG tablet Take 100 mg by mouth Daily.   • [DISCONTINUED] doxazosin (CARDURA) 8 MG tablet Take 8 mg by mouth Every Night.   • [DISCONTINUED] losartan-hydrochlorothiazide (HYZAAR) 50-12.5 MG per tablet Take 1 tablet by mouth Daily.   • [DISCONTINUED] traZODone (DESYREL) 50 MG tablet      No current facility-administered medications on file prior to visit.       Past Medical History:   Diagnosis Date   • Coronary artery disease    • Hyperlipidemia    • Hypertension        Allergies:  Allergies   Allergen Reactions   • Morphine  Rash       Objective    Objective       Vitals:   Heart Rate:  [54-62] 54  BP: (154-184)/(72-74) 184/74  Body mass index is 32.54 kg/m².     PHYSICAL EXAM:    General Appearance:   · well developed  · well nourished  HENT:   · oropharynx moist  · lips not cyanotic  Neck:  · thyroid not enlarged  · supple  Respiratory:  · no respiratory distress  · normal breath sounds  · no rales  Cardiovascular:  · no jugular venous distention  · regular rhythm  · apical impulse normal  · S1 normal, S2 normal  · no S3, no S4   · no murmur  · no rub, no thrill  · carotid pulses normal; no bruit  · pedal pulses normal  · lower extremity edema: none    Skin:   · warm, dry  Psychiatric:  · judgement and insight appropriate  · normal mood and affect        Result Review:  I have personally reviewed the available results from  [x]  Laboratory  [x]  EKG  [x]  Cardiology  [x]  Medications  [x]  Old records  []  Other:     Procedures    Impression/Plan:  1. Coronary s/p CABG stable: Continue aspirin 81 mg a day.  Continue carvedilol 25 mg twice a day.  No chest pain.  2.  Hyperlipidemia: Continue Lipitor 20 mg a day.  Continue fenofibrate 160 mg a day.  3.  Essential hypertension Uncontrolled: Continue Hyzaar 50/12.5 mg a day.  Continue Cardura 8 mg a day.  Continue carvedilol 25 mg twice a day.  Add clonidine 0.2 mg twice a day.  Monitor blood pressure regularly.           Brian Hdez MD   11/21/22   13:08 EST

## 2022-11-21 ENCOUNTER — OFFICE VISIT (OUTPATIENT)
Dept: CARDIOLOGY | Facility: CLINIC | Age: 72
End: 2022-11-21

## 2022-11-21 VITALS
HEIGHT: 68 IN | SYSTOLIC BLOOD PRESSURE: 184 MMHG | HEART RATE: 54 BPM | WEIGHT: 214 LBS | BODY MASS INDEX: 32.43 KG/M2 | DIASTOLIC BLOOD PRESSURE: 74 MMHG

## 2022-11-21 DIAGNOSIS — Z95.1 HX OF CABG: ICD-10-CM

## 2022-11-21 DIAGNOSIS — I10 HYPERTENSION, ESSENTIAL: ICD-10-CM

## 2022-11-21 DIAGNOSIS — E78.2 HYPERLIPEMIA, MIXED: ICD-10-CM

## 2022-11-21 DIAGNOSIS — I25.10 CORONARY ARTERY DISEASE INVOLVING NATIVE CORONARY ARTERY OF NATIVE HEART WITHOUT ANGINA PECTORIS: Primary | ICD-10-CM

## 2022-11-21 PROCEDURE — 99214 OFFICE O/P EST MOD 30 MIN: CPT | Performed by: SPECIALIST

## 2022-11-21 RX ORDER — LOSARTAN POTASSIUM 100 MG/1
100 TABLET ORAL DAILY
Qty: 180 TABLET | Refills: 6 | Status: SHIPPED | OUTPATIENT
Start: 2022-11-21 | End: 2023-02-27 | Stop reason: ALTCHOICE

## 2022-11-21 RX ORDER — CARVEDILOL 25 MG/1
25 TABLET ORAL 2 TIMES DAILY WITH MEALS
Qty: 180 TABLET | Refills: 6 | Status: SHIPPED | OUTPATIENT
Start: 2022-11-21

## 2022-11-21 RX ORDER — CLONIDINE HYDROCHLORIDE 0.1 MG/1
0.2 TABLET ORAL EVERY 12 HOURS SCHEDULED
Status: SHIPPED | OUTPATIENT
Start: 2022-11-21

## 2022-11-21 RX ORDER — LOSARTAN POTASSIUM 100 MG/1
100 TABLET ORAL DAILY
COMMUNITY
Start: 2022-11-17 | End: 2022-11-21 | Stop reason: SDUPTHER

## 2023-02-24 NOTE — PROGRESS NOTES
[facility]  Cardiology progress Note    Patient Name: Leon Silver  : 1950    CHIEF COMPLAINT  Coronary artery disease        Subjective   Subjective     HISTORY OF PRESENT ILLNESS    Leon Silver is a 72 y.o. male with coronary artery s/p CABG.  No chest pain or shortness of breath.  Recently noted to have paroxysmal atrial fibrillation.  No palpitations.    REVIEW OF SYSTEMS    Constitutional:    No fever, no weight loss  Skin:     No rash  Otolaryngeal:    No difficulty swallowing  Cardiovascular: See HPI.  Pulmonary:    No cough, no sputum production    Personal History     Social History:    reports that he has quit smoking. He uses smokeless tobacco. He reports current alcohol use. He reports that he does not use drugs.    Home Medications:  Current Outpatient Medications on File Prior to Visit   Medication Sig   • ascorbic acid (VITAMIN C) 250 MG tablet Take 250 mg by mouth Daily.   • aspirin 81 MG EC tablet Take 81 mg by mouth Daily.   • atorvastatin (LIPITOR) 20 MG tablet Take 20 mg by mouth Daily.   • carvedilol (COREG) 25 MG tablet Take 1 tablet by mouth 2 (Two) Times a Day With Meals.   • cholecalciferol (VITAMIN D3) 25 MCG (1000 UT) tablet Take 500 Units by mouth Daily.   • cyanocobalamin (VITAMIN B-12) 1000 MCG tablet Take 1,000 mcg by mouth Daily.   • Eliquis 5 MG tablet tablet Take 1 tablet by mouth Every 12 (Twelve) Hours.   • fenofibrate 160 MG tablet Take 160 mg by mouth Daily.   • finasteride (PROSCAR) 5 MG tablet    • fluticasone-salmeterol (ADVAIR) 100-50 MCG/DOSE DISKUS INHALE ONE PUFF TWICE DAILY RINSE MOUTH AFTER USE   • levothyroxine (SYNTHROID, LEVOTHROID) 50 MCG tablet Take 50 mcg by mouth Daily.   • omeprazole (priLOSEC) 20 MG capsule Take 20 mg by mouth Daily.   • potassium chloride (K-DUR,KLOR-CON) 10 MEQ CR tablet Take 10 mEq by mouth Daily.   • tamsulosin (FLOMAX) 0.4 MG capsule 24 hr capsule TAKE ONE CAPSULE BY MOUTH EACH NIGHT AT BEDTIME   • [DISCONTINUED]  losartan (COZAAR) 100 MG tablet Take 1 tablet by mouth Daily.     Current Facility-Administered Medications on File Prior to Visit   Medication   • cloNIDine (CATAPRES) tablet 0.2 mg       Past Medical History:   Diagnosis Date   • Coronary artery disease    • Hyperlipidemia    • Hypertension        Allergies:  Allergies   Allergen Reactions   • Morphine Rash       Objective    Objective       Vitals:   Heart Rate:  [82] 82  BP: (113)/(58) 113/58  Body mass index is 28.13 kg/m².     PHYSICAL EXAM:    General Appearance:   · well developed  · well nourished  HENT:   · oropharynx moist  · lips not cyanotic  Neck:  · thyroid not enlarged  · supple  Respiratory:  · no respiratory distress  · normal breath sounds  · no rales  Cardiovascular:  · no jugular venous distention  · regular rhythm  · apical impulse normal  · S1 normal, S2 normal  · no S3, no S4   · no murmur  · no rub, no thrill  · carotid pulses normal; no bruit  · pedal pulses normal  · lower extremity edema: none    Skin:   · warm, dry  Psychiatric:  · judgement and insight appropriate  · normal mood and affect        Result Review:  I have personally reviewed the available results from  [x]  Laboratory  [x]  EKG  [x]  Cardiology  [x]  Medications  [x]  Old records  []  Other:     Procedures    Impression/Plan:  1.  Coronary disease s/p CABG stable: Continue aspirin 81 mg once a day.  Continue carvedilol 25 mg twice a day.  No chest pain.  Sestamibi stress test in 2021 negative for any ischemia.  Moderate risk for any surgical procedure.  2.  Essential hypertension controlled:  Continue carvedilol 25 mg twice a day.  Continue clonidine 0.2 mg twice daily.  Monitor blood pressure regularly.  3.  Hyperlipidemia: Continue Lipitor 20 mg once a day.  Continue fenofibrate 160 mg once a day.  4.  Paroxysmal atrial fibrillation: Continue Eliquis 5 mg twice a day for stroke prevention.  Risk benefits discussed with the patient.  Continue carvedilol 25 mg twice a day  for rate control.  Advised to stop Eliquis 2-3 days prior to any surgical procedure.  Repeat echocardiogram.          Brian Hdez MD   02/27/23   10:38 EST

## 2023-02-27 ENCOUNTER — OFFICE VISIT (OUTPATIENT)
Dept: CARDIOLOGY | Facility: CLINIC | Age: 73
End: 2023-02-27
Payer: MEDICARE

## 2023-02-27 VITALS
HEIGHT: 68 IN | WEIGHT: 185 LBS | BODY MASS INDEX: 28.04 KG/M2 | DIASTOLIC BLOOD PRESSURE: 58 MMHG | SYSTOLIC BLOOD PRESSURE: 113 MMHG | HEART RATE: 82 BPM

## 2023-02-27 DIAGNOSIS — E78.2 HYPERLIPEMIA, MIXED: ICD-10-CM

## 2023-02-27 DIAGNOSIS — I10 HYPERTENSION, ESSENTIAL: Primary | ICD-10-CM

## 2023-02-27 DIAGNOSIS — I48.0 PAROXYSMAL ATRIAL FIBRILLATION: ICD-10-CM

## 2023-02-27 DIAGNOSIS — I25.10 CORONARY ARTERY DISEASE INVOLVING NATIVE CORONARY ARTERY OF NATIVE HEART WITHOUT ANGINA PECTORIS: ICD-10-CM

## 2023-02-27 DIAGNOSIS — Z95.1 HX OF CABG: ICD-10-CM

## 2023-02-27 PROCEDURE — 99214 OFFICE O/P EST MOD 30 MIN: CPT | Performed by: SPECIALIST

## 2023-02-27 RX ORDER — APIXABAN 5 MG/1
1 TABLET, FILM COATED ORAL EVERY 12 HOURS SCHEDULED
COMMUNITY
Start: 2023-02-17

## 2023-02-27 RX ORDER — TAMSULOSIN HYDROCHLORIDE 0.4 MG/1
CAPSULE ORAL
COMMUNITY
Start: 2023-02-07

## 2023-02-27 RX ORDER — ASCORBIC ACID 250 MG
250 TABLET ORAL DAILY
COMMUNITY

## 2023-02-27 RX ORDER — MELATONIN
500 DAILY
COMMUNITY

## 2023-02-27 RX ORDER — FINASTERIDE 5 MG/1
TABLET, FILM COATED ORAL
COMMUNITY
Start: 2023-02-24

## 2023-05-16 ENCOUNTER — TELEPHONE (OUTPATIENT)
Dept: CARDIOLOGY | Facility: CLINIC | Age: 73
End: 2023-05-16
Payer: MEDICARE

## 2023-05-16 NOTE — TELEPHONE ENCOUNTER
----- Message from Alyse Bruce sent at 5/15/2023  1:45 PM EDT -----    ----- Message -----  From: Brian Hdez MD  Sent: 5/15/2023   1:27 PM EDT  To: Alyse Bruce    Notify pt echocardiogram shows normal heart function and no significant valve abnormality. Keep follow up as scheduled. Notify office if symptoms persist/worsen

## 2023-05-16 NOTE — TELEPHONE ENCOUNTER
Gave Patients daugther message that echo shows normal heart function and no significant valve abnormality.  keep f/u as scheduled .  call office if symptoms worsen

## 2023-08-25 NOTE — PROGRESS NOTES
[facility]  Cardiology progress Note    Patient Name: Leon Silver  : 1950    CHIEF COMPLAINT  Coronary artery disease        Subjective   Subjective     HISTORY OF PRESENT ILLNESS    Leon Silver is a 73 y.o. male with coronary disease s/p CABG.  No chest pain or shortness of breath.    REVIEW OF SYSTEMS    Constitutional:    No fever, no weight loss  Skin:     No rash  Otolaryngeal:    No difficulty swallowing  Cardiovascular: See HPI.  Pulmonary:    No cough, no sputum production    Personal History     Social History:    reports that he has quit smoking. He uses smokeless tobacco. He reports current alcohol use. He reports that he does not use drugs.    Home Medications:  Current Outpatient Medications on File Prior to Visit   Medication Sig    ascorbic acid (VITAMIN C) 250 MG tablet Take 1 tablet by mouth Daily.    aspirin 81 MG EC tablet Take 1 tablet by mouth Daily.    atorvastatin (LIPITOR) 20 MG tablet Take 1 tablet by mouth Daily.    carvedilol (COREG) 25 MG tablet Take 1 tablet by mouth 2 (Two) Times a Day With Meals.    cholecalciferol (VITAMIN D3) 25 MCG (1000 UT) tablet Take 0.5 tablets by mouth Daily.    cyanocobalamin (VITAMIN B-12) 1000 MCG tablet Take 1 tablet by mouth Daily.    Eliquis 5 MG tablet tablet Take 1 tablet by mouth Every 12 (Twelve) Hours.    fenofibrate 160 MG tablet Take 1 tablet by mouth Daily.    finasteride (PROSCAR) 5 MG tablet     fluticasone-salmeterol (ADVAIR) 100-50 MCG/DOSE DISKUS INHALE ONE PUFF TWICE DAILY RINSE MOUTH AFTER USE    levothyroxine (SYNTHROID, LEVOTHROID) 50 MCG tablet Take 1 tablet by mouth Daily.    omeprazole (priLOSEC) 20 MG capsule Take 1 capsule by mouth Daily.    potassium chloride (K-DUR,KLOR-CON) 10 MEQ CR tablet Take 1 tablet by mouth Daily.    tamsulosin (FLOMAX) 0.4 MG capsule 24 hr capsule TAKE ONE CAPSULE BY MOUTH EACH NIGHT AT BEDTIME    traMADol (ULTRAM) 50 MG tablet Take 1 tablet by mouth Every 8 (Eight) Hours As Needed for  Moderate Pain.     Current Facility-Administered Medications on File Prior to Visit   Medication    cloNIDine (CATAPRES) tablet 0.2 mg       Past Medical History:   Diagnosis Date    Coronary artery disease     Hyperlipidemia     Hypertension        Allergies:  Allergies   Allergen Reactions    Morphine Rash       Objective    Objective       Vitals:   Heart Rate:  [74] 74  BP: (148)/(96) 148/96  Body mass index is 30.81 kg/mý.     PHYSICAL EXAM:    General Appearance:   well developed  well nourished  HENT:   oropharynx moist  lips not cyanotic  Neck:  thyroid not enlarged  supple  Respiratory:  no respiratory distress  normal breath sounds  no rales  Cardiovascular:  no jugular venous distention  regular rhythm  apical impulse normal  S1 normal, S2 normal  no S3, no S4   no murmur  no rub, no thrill  carotid pulses normal; no bruit  pedal pulses normal  lower extremity edema: none    Skin:   warm, dry  Psychiatric:  judgement and insight appropriate  normal mood and affect        Result Review:  I have personally reviewed the available results from  [x]  Laboratory  [x]  EKG  [x]  Cardiology  [x]  Medications  [x]  Old records  []  Other:     Procedures    Results for orders placed in visit on 05/15/23    Adult Transthoracic Echo Complete W/ Cont if Necessary Per Protocol    Interpretation Summary  Fibrocalcific mitral and aortic valves.  Normal left-ventricular systolic function.  Mild AI.  Mild MR.  Trace TR.     Impression/Plan:   1.  Coronary disease s/p CABG stable: Continue aspirin 81 mg once a day.  Continue carvedilol 25 mg twice a day.  No chest pain.  Sestamibi stress test in 2021 negative for any ischemia.  2.  Essential hypertension controlled:  Continue carvedilol 25 mg twice a day.  Continue clonidine 0.2 mg twice daily.  Monitor blood pressure regularly.  3.  Hyperlipidemia: Continue Lipitor 20 mg once a day.  Continue fenofibrate 160 mg once a day.  4.  Paroxysmal atrial fibrillation: Continue  Eliquis 5 mg twice a day for stroke prevention.  Risk benefits discussed with the patient.  Continue carvedilol 25 mg twice a day.              Brian Hdez MD   08/28/23   13:05 EDT

## 2023-08-28 ENCOUNTER — OFFICE VISIT (OUTPATIENT)
Dept: CARDIOLOGY | Facility: CLINIC | Age: 73
End: 2023-08-28
Payer: MEDICARE

## 2023-08-28 VITALS
BODY MASS INDEX: 30.71 KG/M2 | SYSTOLIC BLOOD PRESSURE: 148 MMHG | WEIGHT: 202.6 LBS | DIASTOLIC BLOOD PRESSURE: 96 MMHG | HEIGHT: 68 IN | HEART RATE: 74 BPM

## 2023-08-28 DIAGNOSIS — I10 HYPERTENSION, ESSENTIAL: ICD-10-CM

## 2023-08-28 DIAGNOSIS — I48.0 PAROXYSMAL ATRIAL FIBRILLATION: ICD-10-CM

## 2023-08-28 DIAGNOSIS — I25.10 CORONARY ARTERY DISEASE INVOLVING NATIVE CORONARY ARTERY OF NATIVE HEART WITHOUT ANGINA PECTORIS: Primary | ICD-10-CM

## 2023-08-28 DIAGNOSIS — E78.2 HYPERLIPEMIA, MIXED: ICD-10-CM

## 2023-08-28 DIAGNOSIS — Z95.1 HX OF CABG: ICD-10-CM

## 2023-08-28 PROCEDURE — 1159F MED LIST DOCD IN RCRD: CPT | Performed by: SPECIALIST

## 2023-08-28 PROCEDURE — 3077F SYST BP >= 140 MM HG: CPT | Performed by: SPECIALIST

## 2023-08-28 PROCEDURE — 1160F RVW MEDS BY RX/DR IN RCRD: CPT | Performed by: SPECIALIST

## 2023-08-28 PROCEDURE — 3080F DIAST BP >= 90 MM HG: CPT | Performed by: SPECIALIST

## 2023-08-28 PROCEDURE — 99214 OFFICE O/P EST MOD 30 MIN: CPT | Performed by: SPECIALIST

## 2023-08-28 RX ORDER — TRAMADOL HYDROCHLORIDE 50 MG/1
50 TABLET ORAL EVERY 8 HOURS PRN
COMMUNITY

## 2024-03-07 ENCOUNTER — TELEPHONE (OUTPATIENT)
Dept: CARDIOLOGY | Facility: CLINIC | Age: 74
End: 2024-03-07
Payer: MEDICARE

## 2024-03-07 NOTE — TELEPHONE ENCOUNTER
Caller: Leon Silver    Relationship: Self    Best call back number: [phone] (home) [phone] (work)         Who are you requesting to speak with (clinical staff, provider,  specific staff member):         What was the call regarding: PATIENT'S FAMILY MEMBER WAS CALLING TO CONFIRM APPOINTMENT FOR PATIENT BUT THEY WEREN'T LISTED ON A VERBAL FORM. PLEASE CALL PATIENT TO CONFIRM APPOINTMENT.

## 2024-03-11 NOTE — PROGRESS NOTES
[facility]  Cardiology progress Note    Patient Name: Leon Silver  : 1950    CHIEF COMPLAINT  CAD        Subjective   Subjective     HISTORY OF PRESENT ILLNESS    Leon Silver is a 73 y.o. male with CAD s/p CABG.  No chest pain or shortness of breath.    REVIEW OF SYSTEMS    Constitutional:    No fever, no weight loss  Skin:     No rash  Otolaryngeal:    No difficulty swallowing  Cardiovascular: See HPI.  Pulmonary:    No cough, no sputum production    Personal History     Social History:    reports that he has quit smoking. He uses smokeless tobacco. He reports current alcohol use. He reports that he does not use drugs.    Home Medications:  Current Outpatient Medications on File Prior to Visit   Medication Sig    ascorbic acid (VITAMIN C) 250 MG tablet Take 1 tablet by mouth Daily.    aspirin 81 MG EC tablet Take 1 tablet by mouth Daily.    atorvastatin (LIPITOR) 20 MG tablet Take 1 tablet by mouth Daily.    carvedilol (COREG) 25 MG tablet Take 1 tablet by mouth 2 (Two) Times a Day With Meals.    cholecalciferol (VITAMIN D3) 25 MCG (1000 UT) tablet Take 0.5 tablets by mouth Daily.    cyanocobalamin (VITAMIN B-12) 1000 MCG tablet Take 1 tablet by mouth Daily.    Eliquis 5 MG tablet tablet Take 1 tablet by mouth Every 12 (Twelve) Hours.    fenofibrate 160 MG tablet Take 1 tablet by mouth Daily.    finasteride (PROSCAR) 5 MG tablet     fluticasone-salmeterol (ADVAIR) 100-50 MCG/DOSE DISKUS INHALE ONE PUFF TWICE DAILY RINSE MOUTH AFTER USE    furosemide (LASIX) 20 MG tablet Take 1 tablet by mouth 2 (Two) Times a Day.    levothyroxine (SYNTHROID, LEVOTHROID) 50 MCG tablet Take 1 tablet by mouth Daily.    losartan (COZAAR) 100 MG tablet Take 1 tablet by mouth Daily.    omeprazole (priLOSEC) 20 MG capsule Take 1 capsule by mouth Daily.    potassium chloride (K-DUR,KLOR-CON) 10 MEQ CR tablet Take 1 tablet by mouth 2 (Two) Times a Day.    rOPINIRole (REQUIP) 0.5 MG tablet     tamsulosin (FLOMAX) 0.4 MG  capsule 24 hr capsule TAKE ONE CAPSULE BY MOUTH EACH NIGHT AT BEDTIME    [DISCONTINUED] traMADol (ULTRAM) 50 MG tablet Take 1 tablet by mouth Every 8 (Eight) Hours As Needed for Moderate Pain.     Current Facility-Administered Medications on File Prior to Visit   Medication    cloNIDine (CATAPRES) tablet 0.2 mg       Past Medical History:   Diagnosis Date    Coronary artery disease     Hyperlipidemia     Hypertension        Allergies:  Allergies   Allergen Reactions    Morphine Rash       Objective    Objective       Vitals:   Heart Rate:  [71-73] 71  BP: (125-142)/(61-65) 125/61  Body mass index is 33.61 kg/m².     PHYSICAL EXAM:    General Appearance:   well developed  well nourished  HENT:   oropharynx moist  lips not cyanotic  Neck:  thyroid not enlarged  supple  Respiratory:  no respiratory distress  normal breath sounds  no rales  Cardiovascular:  no jugular venous distention  regular rhythm  apical impulse normal  S1 normal, S2 normal  no S3, no S4   no murmur  no rub, no thrill  carotid pulses normal; no bruit  pedal pulses normal  lower extremity edema: none    Skin:   warm, dry  Psychiatric:  judgement and insight appropriate  normal mood and affect        Result Review:  I have personally reviewed the available results from  [x]  Laboratory  [x]  EKG  [x]  Cardiology  [x]  Medications  [x]  Old records  []  Other:     Procedures    Results for orders placed in visit on 05/15/23    Adult Transthoracic Echo Complete W/ Cont if Necessary Per Protocol    Interpretation Summary  Fibrocalcific mitral and aortic valves.  Normal left-ventricular systolic function.  Mild AI.  Mild MR.  Trace TR.     Impression/Plan:  1.  Coronary disease s/p CABG stable: Continue aspirin 81 mg once a day.  Continue carvedilol 25 mg twice a day.  No chest pain.  Sestamibi stress test in 2021 negative for any ischemia.  2.  Essential hypertension controlled:  Continue carvedilol 25 mg twice a day.  Continue losartan 100 mg once a  day.  Continue clonidine 0.2 mg twice a day.  Monitor blood pressure regularly.  3.  Mixed hyperlipidemia: Continue Lipitor 20 mg once a day.  Continue fenofibrate 160 mg once a day.  Monitor lipid and hepatic profile.  4.  Paroxysmal atrial fibrillation: Continue Eliquis 5 mg twice a day for stroke prevention.  Continue carvedilol 25 mg twice a day for rate control.        Brian Hdez MD   03/14/24   13:27 EDT

## 2024-03-14 ENCOUNTER — OFFICE VISIT (OUTPATIENT)
Dept: CARDIOLOGY | Facility: CLINIC | Age: 74
End: 2024-03-14
Payer: MEDICARE

## 2024-03-14 VITALS
SYSTOLIC BLOOD PRESSURE: 125 MMHG | HEART RATE: 71 BPM | HEIGHT: 65 IN | DIASTOLIC BLOOD PRESSURE: 61 MMHG | WEIGHT: 202 LBS | BODY MASS INDEX: 33.66 KG/M2

## 2024-03-14 DIAGNOSIS — Z95.1 HX OF CABG: ICD-10-CM

## 2024-03-14 DIAGNOSIS — I25.10 CORONARY ARTERY DISEASE INVOLVING NATIVE CORONARY ARTERY OF NATIVE HEART WITHOUT ANGINA PECTORIS: Primary | ICD-10-CM

## 2024-03-14 DIAGNOSIS — I10 HYPERTENSION, ESSENTIAL: ICD-10-CM

## 2024-03-14 DIAGNOSIS — E78.2 HYPERLIPEMIA, MIXED: ICD-10-CM

## 2024-03-14 DIAGNOSIS — I48.0 PAROXYSMAL ATRIAL FIBRILLATION: ICD-10-CM

## 2024-03-14 PROCEDURE — 3074F SYST BP LT 130 MM HG: CPT | Performed by: SPECIALIST

## 2024-03-14 PROCEDURE — 1160F RVW MEDS BY RX/DR IN RCRD: CPT | Performed by: SPECIALIST

## 2024-03-14 PROCEDURE — 3078F DIAST BP <80 MM HG: CPT | Performed by: SPECIALIST

## 2024-03-14 PROCEDURE — 1159F MED LIST DOCD IN RCRD: CPT | Performed by: SPECIALIST

## 2024-03-14 PROCEDURE — 99214 OFFICE O/P EST MOD 30 MIN: CPT | Performed by: SPECIALIST

## 2024-03-14 RX ORDER — ROPINIROLE 0.5 MG/1
TABLET, FILM COATED ORAL
COMMUNITY
Start: 2024-01-30

## 2024-03-14 RX ORDER — LOSARTAN POTASSIUM 100 MG/1
100 TABLET ORAL DAILY
COMMUNITY

## 2024-03-14 RX ORDER — FUROSEMIDE 20 MG/1
20 TABLET ORAL 2 TIMES DAILY
COMMUNITY

## 2024-09-09 ENCOUNTER — OFFICE VISIT (OUTPATIENT)
Dept: CARDIOLOGY | Facility: CLINIC | Age: 74
End: 2024-09-09
Payer: MEDICARE

## 2024-09-09 VITALS
SYSTOLIC BLOOD PRESSURE: 148 MMHG | HEART RATE: 73 BPM | HEIGHT: 65 IN | WEIGHT: 182 LBS | DIASTOLIC BLOOD PRESSURE: 75 MMHG | BODY MASS INDEX: 30.32 KG/M2

## 2024-09-09 DIAGNOSIS — I25.10 CORONARY ARTERY DISEASE INVOLVING NATIVE CORONARY ARTERY OF NATIVE HEART WITHOUT ANGINA PECTORIS: Primary | ICD-10-CM

## 2024-09-09 DIAGNOSIS — E78.2 HYPERLIPEMIA, MIXED: ICD-10-CM

## 2024-09-09 DIAGNOSIS — I48.0 PAROXYSMAL ATRIAL FIBRILLATION: ICD-10-CM

## 2024-09-09 DIAGNOSIS — I10 HYPERTENSION, ESSENTIAL: ICD-10-CM

## 2024-09-09 DIAGNOSIS — Z95.1 HX OF CABG: ICD-10-CM

## 2024-09-09 PROCEDURE — 3078F DIAST BP <80 MM HG: CPT | Performed by: SPECIALIST

## 2024-09-09 PROCEDURE — 99214 OFFICE O/P EST MOD 30 MIN: CPT | Performed by: SPECIALIST

## 2024-09-09 PROCEDURE — 1159F MED LIST DOCD IN RCRD: CPT | Performed by: SPECIALIST

## 2024-09-09 PROCEDURE — 3077F SYST BP >= 140 MM HG: CPT | Performed by: SPECIALIST

## 2024-09-09 PROCEDURE — 1160F RVW MEDS BY RX/DR IN RCRD: CPT | Performed by: SPECIALIST

## 2024-09-09 RX ORDER — TRAMADOL HYDROCHLORIDE 50 MG/1
TABLET ORAL
COMMUNITY
Start: 2024-06-11

## 2024-09-09 RX ORDER — TRAZODONE HYDROCHLORIDE 50 MG/1
TABLET, FILM COATED ORAL
COMMUNITY
Start: 2024-07-23

## 2025-03-06 NOTE — PROGRESS NOTES
[facility]  Cardiology progress Note    Patient Name: Leon Silver  : 1950    CHIEF COMPLAINT  CAD        Subjective   Subjective     HISTORY OF PRESENT ILLNESS    Leon Silver is a 74 y.o. male with CAD status post CABG.  No chest pain.    REVIEW OF SYSTEMS    Constitutional:    No fever, no weight loss  Skin:     No rash  Otolaryngeal:    No difficulty swallowing  Cardiovascular: See HPI.  Pulmonary:    No cough, no sputum production    Personal History     Social History:    reports that he has quit smoking. He uses smokeless tobacco. He reports current alcohol use. He reports that he does not use drugs.    Home Medications:  Current Outpatient Medications on File Prior to Visit   Medication Sig    amlodipine-olmesartan (JACOB) 5-40 MG per tablet     ascorbic acid (VITAMIN C) 250 MG tablet Take 1 tablet by mouth Daily.    aspirin 81 MG EC tablet Take 1 tablet by mouth Daily.    atorvastatin (LIPITOR) 20 MG tablet Take 1 tablet by mouth Daily.    carvedilol (COREG) 25 MG tablet Take 1 tablet by mouth 2 (Two) Times a Day With Meals.    cholecalciferol (VITAMIN D3) 25 MCG (1000 UT) tablet Take 0.5 tablets by mouth Daily.    cyanocobalamin (VITAMIN B-12) 1000 MCG tablet Take 1 tablet by mouth Daily.    Eliquis 5 MG tablet tablet Take 1 tablet by mouth Every 12 (Twelve) Hours.    fenofibrate 160 MG tablet Take 1 tablet by mouth Daily.    ferrous sulfate 325 (65 FE) MG EC tablet     finasteride (PROSCAR) 5 MG tablet     fluticasone-salmeterol (ADVAIR) 100-50 MCG/DOSE DISKUS INHALE ONE PUFF TWICE DAILY RINSE MOUTH AFTER USE    furosemide (LASIX) 20 MG tablet Take 1 tablet by mouth 2 (Two) Times a Day.    levothyroxine (SYNTHROID, LEVOTHROID) 50 MCG tablet Take 1 tablet by mouth Daily.    Multiple Vitamins-Minerals (High Potency MultiVit/FA) tablet TAKE ONE TABLET BY MOUTH TWICE DAILY with morning AND evening meals FOR ANEMIA    omeprazole (priLOSEC) 20 MG capsule Take 1 capsule by mouth Daily.     potassium chloride (K-DUR,KLOR-CON) 10 MEQ CR tablet Take 1 tablet by mouth 3 (Three) Times a Day.    rOPINIRole (REQUIP) 0.5 MG tablet     tamsulosin (FLOMAX) 0.4 MG capsule 24 hr capsule TAKE ONE CAPSULE BY MOUTH EACH NIGHT AT BEDTIME    traMADol (ULTRAM) 50 MG tablet TAKE ONE TABLET BY MOUTH THREE TIMES DAILY AS NEEDED FOR arthritis joint pain    traZODone (DESYREL) 50 MG tablet TAKE ONE TABLET BY MOUTH EACH NIGHT AT BEDTIME AS NEEDED FOR SLEEP    [DISCONTINUED] losartan (COZAAR) 100 MG tablet Take 1 tablet by mouth Daily.     Current Facility-Administered Medications on File Prior to Visit   Medication    cloNIDine (CATAPRES) tablet 0.2 mg       Past Medical History:   Diagnosis Date    Coronary artery disease     Hyperlipidemia     Hypertension        Allergies:  Allergies   Allergen Reactions    Morphine Rash       Objective    Objective       Vitals:   Heart Rate:  [83] 83  BP: (129)/(60) 129/60  Body mass index is 31.45 kg/m².     PHYSICAL EXAM:    General Appearance:   well developed  well nourished  HENT:   oropharynx moist  lips not cyanotic  Neck:  thyroid not enlarged  supple  Respiratory:  no respiratory distress  normal breath sounds  no rales  Cardiovascular:  no jugular venous distention  regular rhythm  apical impulse normal  S1 normal, S2 normal  no S3, no S4   no murmur  no rub, no thrill  carotid pulses normal; no bruit  pedal pulses normal  lower extremity edema: none    Skin:   warm, dry  Psychiatric:  judgement and insight appropriate  normal mood and affect        Result Review:  I have personally reviewed the available results from  [x]  Laboratory  [x]  EKG  [x]  Cardiology  [x]  Medications  [x]  Old records  []  Other:     Procedures    Results for orders placed in visit on 05/15/23    Adult Transthoracic Echo Complete W/ Cont if Necessary Per Protocol    Interpretation Summary  Fibrocalcific mitral and aortic valves.  Normal left-ventricular systolic function.  Mild AI.  Mild MR.  Trace  TR.     Impression/Plan:   1. Coronary disease s/p CABG stable: Continue aspirin 81 mg once a day.  Continue carvedilol 25 mg twice a day.  No chest pain.  Sestamibi stress test in 2021 negative for any ischemia.  2.  Essential hypertension controlled:  Continue carvedilol 25 mg twice a day.  Continue Phillip 5/40 mg once a day.  Monitor blood pressure regularly.  3.  Mixed hyperlipidemia: Continue Lipitor 20 mg once a day.  Continue fenofibrate 160 mg once a day.  Monitor lipid and hepatic profile.  4.  Paroxysmal atrial fibrillation: Continue Eliquis 5 mg twice a day for stroke prevention.  Continue carvedilol 25 mg twice a day for rate control.              Brian Hdez MD   03/10/25   13:22 EDT

## 2025-03-09 NOTE — PROGRESS NOTES
[facility]  Cardiology progress Note    Patient Name: Leon Silver  : 1950    CHIEF COMPLAINT  Coronary artery disease, hypertension.      Subjective   Subjective     HISTORY OF PRESENT ILLNESS    Leon Silver is a 71 y.o. male with history of coronary disease s/p CABG.  No chest pain.  Shortness of breath stable.    Review of Systems:   Constitutional no fever,  no weight loss   Skin no rash   Otolaryngeal no difficulty swallowing   Cardiovascular See HPI   Pulmonary no cough, no sputum production   Gastrointestinal no constipation, no diarrhea   Genitourinary no dysuria, no hematuria   Hematologic no easy bruisability, no abnormal bleeding   Musculoskeletal no muscle pain   Neurologic no dizziness, no falls         Personal History     Social History:  reports that he has quit smoking. He uses smokeless tobacco. He reports current alcohol use. He reports that he does not use drugs.    Home Medications:  Current Outpatient Medications on File Prior to Visit   Medication Sig   • aspirin 81 MG EC tablet Take 81 mg by mouth Daily.   • atorvastatin (LIPITOR) 20 MG tablet Take 20 mg by mouth Daily.   • carvedilol (COREG) 12.5 MG tablet Take  by mouth 2 (Two) Times a Day With Meals.   • doxazosin (CARDURA) 8 MG tablet Take 8 mg by mouth Every Night.   • fenofibrate 160 MG tablet Take 160 mg by mouth Daily.   • levothyroxine (SYNTHROID, LEVOTHROID) 50 MCG tablet Take 50 mcg by mouth Daily.   • losartan-hydrochlorothiazide (HYZAAR) 50-12.5 MG per tablet Take 1 tablet by mouth Daily.   • omeprazole (priLOSEC) 20 MG capsule Take 20 mg by mouth Daily.   • potassium chloride (K-DUR,KLOR-CON) 10 MEQ CR tablet Take 10 mEq by mouth Daily.   • fluticasone-salmeterol (ADVAIR) 100-50 MCG/DOSE DISKUS INHALE ONE PUFF TWICE DAILY RINSE MOUTH AFTER USE     No current facility-administered medications on file prior to visit.     Allergies:  Allergies   Allergen Reactions   • Morphine Rash       Objective    Objective  Patient alert and oriented x 4 on room air. Patient denies pain. Monitor heart rate. Plan for PT/OT and cardiology consult. Call light within reach and safety precautions in place.     Problem: Patient Centered Care  Goal: Patient preferences are identified and integrated in the patient's plan of care  Description: Interventions:  - What would you like us to know as we care for you? I am from home with my wife.   - Provide timely, complete, and accurate information to patient/family  - Incorporate patient and family knowledge, values, beliefs, and cultural backgrounds into the planning and delivery of care  - Encourage patient/family to participate in care and decision-making at the level they choose  - Honor patient and family perspectives and choices  Outcome: Progressing     Problem: Diabetes/Glucose Control  Goal: Glucose maintained within prescribed range  Description: INTERVENTIONS:  - Monitor Blood Glucose as ordered  - Assess for signs and symptoms of hyperglycemia and hypoglycemia  - Administer ordered medications to maintain glucose within target range  - Assess barriers to adequate nutritional intake and initiate nutrition consult as needed  - Instruct patient on self management of diabetes  Outcome: Progressing     Problem: Patient/Family Goals  Goal: Patient/Family Long Term Goal  Description: Patient's Long Term Goal:     Interventions:  -   - See additional Care Plan goals for specific interventions  Outcome: Progressing  Goal: Patient/Family Short Term Goal  Description: Patient's Short Term Goal:     Interventions:   -   - See additional Care Plan goals for specific interventions  Outcome: Progressing            Vitals:   Heart Rate:  [86] 86  BP: (136)/(88) 136/88  Body mass index is 32.54 kg/m².     Physical Exam:   Constitutional: Awake, alert, No acute distress    Eyes: PERRLA, sclerae anicteric, no conjunctival injection   HENT: NCAT, mucous membranes moist   Neck: Supple, no thyromegaly, no lymphadenopathy, trachea midline   Respiratory: Clear to auscultation bilaterally, nonlabored respirations    Cardiovascular: RRR, no murmurs or rubs. Palpable pedal pulses bilaterally   Musculoskeletal: No bilateral ankle edema, no cyanosis to extremities   Psychiatric: Appropriate affect, cooperative   Neurologic: Oriented x 3, strength symmetric in all extremities, Cranial Nerves grossly intact to confrontation, speech clear   Skin: No rashes.    Result Review    Result Review:  I have personally reviewed the available results from  [x]  Laboratory  [x]  EKG  [x]  Cardiology  [x]  Medications  [x]  Old records  []  Other:   Procedures  No results found for: CHOL  No results found for: TRIG  No results found for: HDL  No results found for: LDL  No results found for: VLDL      Impression/Plan:  1.  Coronary disease s/p CABG stable: Continue aspirin 81 mg once a day.  Continue carvedilol 12.5 mg twice daily.  No further chest pain.  2.  Essential hypertension controlled: Continue Hyzaar 50/12.5 mg once a day.  Continue Cardura 8 mg once a day.  Blood pressure well controlled at home.  Able to tolerate this antihypertensives without any side effects.  3.  Hyperlipidemia: Continue Lipitor 20 mg once a day.  Continue fenofibrate 160 mg once a day.  Lipid and hepatic profile next visit.  Able to tolerate Lipitor without any side effects.           Brian Hdez MD   10/25/21   12:54 EDT    Marybeth Vuong

## 2025-03-10 ENCOUNTER — OFFICE VISIT (OUTPATIENT)
Dept: CARDIOLOGY | Facility: CLINIC | Age: 75
End: 2025-03-10
Payer: MEDICARE

## 2025-03-10 VITALS
HEART RATE: 83 BPM | BODY MASS INDEX: 31.49 KG/M2 | HEIGHT: 65 IN | DIASTOLIC BLOOD PRESSURE: 60 MMHG | SYSTOLIC BLOOD PRESSURE: 129 MMHG | WEIGHT: 189 LBS

## 2025-03-10 DIAGNOSIS — E78.2 HYPERLIPEMIA, MIXED: Primary | ICD-10-CM

## 2025-03-10 DIAGNOSIS — I48.0 PAROXYSMAL ATRIAL FIBRILLATION: ICD-10-CM

## 2025-03-10 DIAGNOSIS — I10 HYPERTENSION, ESSENTIAL: ICD-10-CM

## 2025-03-10 DIAGNOSIS — Z95.1 HX OF CABG: ICD-10-CM

## 2025-03-10 DIAGNOSIS — I25.10 CORONARY ARTERY DISEASE INVOLVING NATIVE CORONARY ARTERY OF NATIVE HEART WITHOUT ANGINA PECTORIS: ICD-10-CM

## 2025-03-10 PROCEDURE — 1160F RVW MEDS BY RX/DR IN RCRD: CPT | Performed by: SPECIALIST

## 2025-03-10 PROCEDURE — 3078F DIAST BP <80 MM HG: CPT | Performed by: SPECIALIST

## 2025-03-10 PROCEDURE — 99214 OFFICE O/P EST MOD 30 MIN: CPT | Performed by: SPECIALIST

## 2025-03-10 PROCEDURE — 1159F MED LIST DOCD IN RCRD: CPT | Performed by: SPECIALIST

## 2025-03-10 PROCEDURE — 3074F SYST BP LT 130 MM HG: CPT | Performed by: SPECIALIST

## 2025-03-10 RX ORDER — FERROUS SULFATE 325(65) MG
TABLET, DELAYED RELEASE (ENTERIC COATED) ORAL
COMMUNITY
Start: 2025-02-21

## 2025-03-10 RX ORDER — AMLODIPINE AND OLMESARTAN MEDOXOMIL 5; 40 MG/1; MG/1
TABLET ORAL
COMMUNITY
Start: 2025-02-14

## 2025-03-10 RX ORDER — MULTIVITAMIN/IRON/FOLIC ACID 18MG-0.4MG
TABLET ORAL
COMMUNITY
Start: 2025-02-11